# Patient Record
Sex: FEMALE | URBAN - NONMETROPOLITAN AREA
[De-identification: names, ages, dates, MRNs, and addresses within clinical notes are randomized per-mention and may not be internally consistent; named-entity substitution may affect disease eponyms.]

---

## 2018-10-16 ENCOUNTER — OFFICE VISIT (OUTPATIENT)
Dept: PRIMARY CARE CLINIC | Age: 33
End: 2018-10-16
Payer: COMMERCIAL

## 2018-10-16 VITALS
HEIGHT: 63 IN | WEIGHT: 273 LBS | OXYGEN SATURATION: 99 % | HEART RATE: 100 BPM | DIASTOLIC BLOOD PRESSURE: 80 MMHG | TEMPERATURE: 98.1 F | BODY MASS INDEX: 48.37 KG/M2 | SYSTOLIC BLOOD PRESSURE: 124 MMHG

## 2018-10-16 DIAGNOSIS — J00 COMMON COLD: Primary | ICD-10-CM

## 2018-10-16 DIAGNOSIS — J30.2 SEASONAL ALLERGIC RHINITIS, UNSPECIFIED TRIGGER: ICD-10-CM

## 2018-10-16 DIAGNOSIS — R09.81 NASAL CONGESTION: ICD-10-CM

## 2018-10-16 PROCEDURE — 99203 OFFICE O/P NEW LOW 30 MIN: CPT | Performed by: NURSE PRACTITIONER

## 2018-10-16 RX ORDER — MONTELUKAST SODIUM 10 MG/1
10 TABLET ORAL NIGHTLY
COMMUNITY

## 2018-10-16 ASSESSMENT — ENCOUNTER SYMPTOMS
EYES NEGATIVE: 1
SINUS PRESSURE: 1
SINUS PAIN: 1
FACIAL SWELLING: 1
SORE THROAT: 1
RHINORRHEA: 1
RESPIRATORY NEGATIVE: 1

## 2018-10-17 NOTE — PATIENT INSTRUCTIONS
acetaminophen (Tylenol) can be harmful. · Get plenty of rest.  · Do not smoke or allow others to smoke around you. If you need help quitting, talk to your doctor about stop-smoking programs and medicines. These can increase your chances of quitting for good. When should you call for help? Call 911 anytime you think you may need emergency care. For example, call if:    · You have severe trouble breathing.    Call your doctor now or seek immediate medical care if:    · You seem to be getting much sicker.     · You have new or worse trouble breathing.     · You have a new or higher fever.     · You have a new rash.    Watch closely for changes in your health, and be sure to contact your doctor if:    · You have a new symptom, such as a sore throat, an earache, or sinus pain.     · You cough more deeply or more often, especially if you notice more mucus or a change in the color of your mucus.     · You do not get better as expected. Where can you learn more? Go to https://[employer].ee[employer]. org and sign in to your "[employer] account. Enter Z607 in the [employer] box to learn more about \"Upper Respiratory Infection (Cold): Care Instructions. \"     If you do not have an account, please click on the \"Sign Up Now\" link. Current as of: December 6, 2017  Content Version: 11.7  © 1349-9116 [employer]. Care instructions adapted under license by [facility] ([facility]). If you have questions about a medical condition or this instruction, always ask your healthcare professional. [place] [address] any warranty or liability for your use of this information.

## 2018-10-17 NOTE — PROGRESS NOTES
Cardiovascular: Negative. Physical Exam   Constitutional: Vital signs are normal. She appears well-developed and well-nourished. She appears ill (mildly ill appearing). HENT:   Right Ear: Tympanic membrane and ear canal normal.   Left Ear: Tympanic membrane and ear canal normal.   Nose: Mucosal edema (pale enlarged turbinates) and rhinorrhea (clear mucous) present. Right sinus exhibits maxillary sinus tenderness. Right sinus exhibits no frontal sinus tenderness. Left sinus exhibits no maxillary sinus tenderness and no frontal sinus tenderness. Mouth/Throat: No oropharyngeal exudate, posterior oropharyngeal edema, posterior oropharyngeal erythema or tonsillar abscesses. Cardiovascular: Normal rate and regular rhythm. Pulmonary/Chest: Effort normal and breath sounds normal.   Lymphadenopathy:     She has no cervical adenopathy. Data reviewed:      ASSESSMENT:   Diagnosis Orders   1. Common cold     2. Nasal congestion     3. Seasonal allergic rhinitis, unspecified trigger         PLAN:  Return if symptoms worsen or fail to improve. No orders of the defined types were placed in this encounter. Patient's physical exam consistent with nasal congestion secondary to allergic rhinitis vs the common cold. I started to discuss with her that since her PE findings were not suggestive of a bacterial infection, she is afebrile, and she is only day 4 of symptoms, that an antibiotic is not warranted at this time. I suggested continuing nasal saline washes followed Flonase nasal spray, and that she may add on Coricidin HBP for nasal congestion due to her hx of cardiac ablation and avoidance of pseudoephedrine. Patient was not satisfied with this response and left.        Electronically signed by CHELSEA Cintron CNP on 10/16/18 at 8:13 PM